# Patient Record
Sex: FEMALE | URBAN - METROPOLITAN AREA
[De-identification: names, ages, dates, MRNs, and addresses within clinical notes are randomized per-mention and may not be internally consistent; named-entity substitution may affect disease eponyms.]

---

## 2020-06-01 ENCOUNTER — TELEPHONE ENCOUNTER (OUTPATIENT)
Dept: URBAN - METROPOLITAN AREA CLINIC 23 | Facility: CLINIC | Age: 40
End: 2020-06-01

## 2020-06-02 ENCOUNTER — OFFICE VISIT (OUTPATIENT)
Dept: URBAN - METROPOLITAN AREA TELEHEALTH 2 | Facility: TELEHEALTH | Age: 40
End: 2020-06-02
Payer: COMMERCIAL

## 2020-06-02 DIAGNOSIS — R19.7 DIARRHEA, UNSPECIFIED TYPE: ICD-10-CM

## 2020-06-02 DIAGNOSIS — R11.0 NAUSEA: ICD-10-CM

## 2020-06-02 PROBLEM — 422587007: Status: ACTIVE | Noted: 2020-06-02

## 2020-06-02 PROCEDURE — 99213 OFFICE O/P EST LOW 20 MIN: CPT | Performed by: INTERNAL MEDICINE

## 2020-06-02 PROCEDURE — G8420 CALC BMI NORM PARAMETERS: HCPCS | Performed by: INTERNAL MEDICINE

## 2020-06-02 PROCEDURE — G9903 PT SCRN TBCO ID AS NON USER: HCPCS | Performed by: INTERNAL MEDICINE

## 2020-06-02 PROCEDURE — G8427 DOCREV CUR MEDS BY ELIG CLIN: HCPCS | Performed by: INTERNAL MEDICINE

## 2020-06-02 RX ORDER — BISMUTH SUBSALICYLATE 262 MG/1
CHEW 2 TABLET BY ORAL ROUTE 4 TIMES A DAY FOR 14 DAYS TABLET, CHEWABLE ORAL
Qty: 80 | Refills: 0 | Status: ACTIVE | COMMUNITY
Start: 2020-05-28 | End: 2020-06-11

## 2020-06-02 RX ORDER — ONDANSETRON 4 MG/1
1 TABLET ON THE TONGUE AND ALLOW TO DISSOLVE TABLET, ORALLY DISINTEGRATING ORAL ONCE A DAY
Qty: 30 | OUTPATIENT
Start: 2020-06-02

## 2020-06-02 RX ORDER — FAMOTIDINE 20 MG/1
TAKE 1 TABLET BY ORAL ROUTE 2 TIMES A DAY AS NEEDED FOR 30 DAYS TABLET ORAL 2
Qty: 180 | Refills: 0 | Status: ACTIVE | COMMUNITY
Start: 2020-05-28 | End: 2020-06-27

## 2020-06-02 RX ORDER — CHOLESTYRAMINE 4 G/5G
1 SCOOP POWDER, FOR SUSPENSION ORAL TWICE A DAY
Qty: 60 | OUTPATIENT
Start: 2020-06-02

## 2020-06-02 RX ORDER — METRONIDAZOLE 250 MG/1
TAKE 1 TABLET (250 MG) BY ORAL ROUTE 4 TIMES PER DAY WITH MEALS AND AT BEDTIME WITH TETRACYCLINE AND BISMUTH SUBSALICYLATE FOR 14 DAYS TABLET ORAL
Qty: 40 | Refills: 0 | Status: ACTIVE | COMMUNITY
Start: 2020-05-28 | End: 2020-06-11

## 2020-06-02 RX ORDER — DOXYCYCLINE 100 MG/1
TAKE 1 CAPSULE BY ORAL ROUTE 2 TIMES A DAY FOR 14 DAYS CAPSULE ORAL 2
Qty: 20 | Refills: 0 | Status: ACTIVE | COMMUNITY
Start: 2020-05-28 | End: 2020-06-11

## 2020-06-02 RX ORDER — COLESEVELAM HYDROCHLORIDE 625 MG/1
TAKE 1 TABLET BY ORAL ROUTE 3 TIMES A DAY FOR 90 DAYS TABLET, FILM COATED ORAL
Qty: 270 | Refills: 1 | Status: ACTIVE | COMMUNITY
Start: 2019-12-31 | End: 2020-06-28

## 2020-06-02 NOTE — HPI-TODAY'S VISIT:
She has been having increasing diarrhea over the past several days. She has already had 14 bowel movements at the time of our phone conversation today at 2 PM.   She did recently start metronidazole and tetracycline after being called by her pharmacy to come in to  the medications.  On review of her records this was likely sent in due to h-pylori during a hospitalization is April. She is having nasuea and has been taking zofran, welchol and imodium without relief.   Attempts were made to use real-time two-way video technology for today's encounter. Due to a technological failure or access issues, telephone technology was used in lieu of an office visit due to the current COVID-19 pandemic crisis and need for social isolation. Patient seen today via telehealth by agreement and consent of patient in light of current COVID-19 pandemic. I used a telephone call during the visit. The patient encounter is appropriate and reasonable under the circumstances given the patient's particular presentation at this time. The patient has been advised of the followin) the potential risks and limitations of this mode of treatment (including but not limited to the absence of in-person examination); 2) the right to refuse telehealth services at any point without affecting the right to future care; 3) the right to receive in-person services, included immediately after this consultation if an urgent need arises; 4) information, including identifiable images or information from this telehealth consult, will only be shared in accordance with HIPPA regulations. Any and all of the patient's and/or patient's family member's questions on this issue have been answered. The patient has verbally consented to be treated via telehealth services. The patient has also been advised to contact this office for worsening conditions or problems, and seek emergency medical treatment and/or call 911 if the patient deems either necessary. [Procedure: ___] : Procedure performed: [unfilled]  [Date of Surgery: ___] : Date of Surgery:   [unfilled] [___ Months Post Op] : [unfilled] months [Pre-Op Weight ___] : Pre-op weight was [unfilled] lbs [de-identified] : Patient had surgery approximately 3 months ago. \par She had an episode of heartburn but denies reflux/vomiting and food intolerance.\par Diabetes resolved and her hypertension, back and knee pain all improved.  She is using her CPAP machine but her throat is very dry in the mornings.  Recommend that she follows up with pulmonary.

## 2020-06-03 ENCOUNTER — TELEPHONE ENCOUNTER (OUTPATIENT)
Dept: URBAN - METROPOLITAN AREA CLINIC 92 | Facility: CLINIC | Age: 40
End: 2020-06-03

## 2020-06-11 ENCOUNTER — TELEPHONE ENCOUNTER (OUTPATIENT)
Dept: URBAN - METROPOLITAN AREA CLINIC 92 | Facility: CLINIC | Age: 40
End: 2020-06-11

## 2020-06-11 ENCOUNTER — TELEPHONE ENCOUNTER (OUTPATIENT)
Dept: URBAN - METROPOLITAN AREA CLINIC 80 | Facility: CLINIC | Age: 40
End: 2020-06-11

## 2020-06-12 ENCOUNTER — OUT OF OFFICE VISIT (OUTPATIENT)
Dept: URBAN - METROPOLITAN AREA MEDICAL CENTER 18 | Facility: MEDICAL CENTER | Age: 40
End: 2020-06-12
Payer: COMMERCIAL

## 2020-06-12 DIAGNOSIS — R10.84 ABDOMINAL CRAMPING, GENERALIZED: ICD-10-CM

## 2020-06-12 DIAGNOSIS — Z85.048 HISTORY OF ANAL CANCER: ICD-10-CM

## 2020-06-12 DIAGNOSIS — R19.7 ACUTE DIARRHEA: ICD-10-CM

## 2020-06-12 DIAGNOSIS — A04.72 C. DIFFICILE COLITIS: ICD-10-CM

## 2020-06-12 PROCEDURE — 99253 IP/OBS CNSLTJ NEW/EST LOW 45: CPT | Performed by: INTERNAL MEDICINE

## 2020-06-12 PROCEDURE — 99232 SBSQ HOSP IP/OBS MODERATE 35: CPT | Performed by: INTERNAL MEDICINE

## 2020-06-12 PROCEDURE — G8427 DOCREV CUR MEDS BY ELIG CLIN: HCPCS | Performed by: INTERNAL MEDICINE

## 2020-06-17 ENCOUNTER — TELEPHONE ENCOUNTER (OUTPATIENT)
Dept: URBAN - METROPOLITAN AREA CLINIC 80 | Facility: CLINIC | Age: 40
End: 2020-06-17

## 2020-06-29 ENCOUNTER — OFFICE VISIT (OUTPATIENT)
Dept: URBAN - METROPOLITAN AREA CLINIC 80 | Facility: CLINIC | Age: 40
End: 2020-06-29
Payer: COMMERCIAL

## 2020-06-29 DIAGNOSIS — A04.8 HELICOBACTER PYLORI (H. PYLORI): ICD-10-CM

## 2020-06-29 DIAGNOSIS — K21.9 GASTROESOPHAGEAL REFLUX DISEASE WITHOUT ESOPHAGITIS: ICD-10-CM

## 2020-06-29 DIAGNOSIS — Z85.038 HISTORY OF COLON CANCER: ICD-10-CM

## 2020-06-29 DIAGNOSIS — A04.72 C. DIFFICILE COLITIS: ICD-10-CM

## 2020-06-29 PROCEDURE — G8427 DOCREV CUR MEDS BY ELIG CLIN: HCPCS | Performed by: INTERNAL MEDICINE

## 2020-06-29 PROCEDURE — 1036F TOBACCO NON-USER: CPT | Performed by: INTERNAL MEDICINE

## 2020-06-29 PROCEDURE — 99214 OFFICE O/P EST MOD 30 MIN: CPT | Performed by: INTERNAL MEDICINE

## 2020-06-29 PROCEDURE — G8417 CALC BMI ABV UP PARAM F/U: HCPCS | Performed by: INTERNAL MEDICINE

## 2020-06-29 RX ORDER — COLESEVELAM HYDROCHLORIDE 625 MG/1
1 TABLET TABLET, FILM COATED ORAL TWICE A DAY
Qty: 60 | Refills: 3 | OUTPATIENT
Start: 2020-06-29

## 2020-06-29 RX ORDER — ONDANSETRON 4 MG/1
1 TABLET ON THE TONGUE AND ALLOW TO DISSOLVE TABLET, ORALLY DISINTEGRATING ORAL ONCE A DAY
Qty: 30 | Status: ACTIVE | COMMUNITY
Start: 2020-06-02

## 2020-06-29 RX ORDER — VANCOMYCIN HYDROCHLORIDE 250 MG/1
1 CAPSULE CAPSULE ORAL
Qty: 56 CAPSULE | Refills: 1 | OUTPATIENT
Start: 2020-06-29 | End: 2020-07-27

## 2020-06-29 RX ORDER — PANTOPRAZOLE SODIUM 40 MG/1
1 TABLET TABLET, DELAYED RELEASE ORAL ONCE A DAY
Qty: 30 TABLET | Refills: 3 | OUTPATIENT
Start: 2020-06-29

## 2020-06-29 RX ORDER — CHOLESTYRAMINE 4 G/5G
1 SCOOP POWDER, FOR SUSPENSION ORAL TWICE A DAY
Qty: 60 | Status: ACTIVE | COMMUNITY
Start: 2020-06-02

## 2020-06-29 NOTE — HPI-TODAY'S VISIT:
Hospitalized with cdiff 2nd wk of June: Vanco, then dificd added; improved; on discharge was given Dificid but was too expensive: Vanco 250 QID, that finished on June 21- has been off for a week and did well until yesterday.  Diarrhea resumed +nausea.  Finished Welchol.  Protonix also feels like that is a major factor.   Hospitalized for Cdiff beginning of June.  Review is that Abx for Hyplori caused initial Cdiff

## 2020-06-29 NOTE — PHYSICAL EXAM HENT:
Head, normocephalic, atraumatic, Face, Face within normal limits, Ears, External ears within normal limits, Nose/Nasopharynx, External nose  normal appearance, nares patent, no nasal discharge, Mouth and Throat, Oral cavity appearance normal, Breath odor normal, Lips, Appearance normal
33

## 2020-07-13 ENCOUNTER — OFFICE VISIT (OUTPATIENT)
Dept: URBAN - METROPOLITAN AREA CLINIC 80 | Facility: CLINIC | Age: 40
End: 2020-07-13

## 2020-08-05 ENCOUNTER — OFFICE VISIT (OUTPATIENT)
Dept: URBAN - METROPOLITAN AREA CLINIC 80 | Facility: CLINIC | Age: 40
End: 2020-08-05
Payer: COMMERCIAL

## 2020-08-05 ENCOUNTER — LAB OUTSIDE AN ENCOUNTER (OUTPATIENT)
Dept: URBAN - METROPOLITAN AREA CLINIC 80 | Facility: CLINIC | Age: 40
End: 2020-08-05

## 2020-08-05 DIAGNOSIS — R10.84 GENERALIZED ABDOMINAL PAIN: ICD-10-CM

## 2020-08-05 DIAGNOSIS — A04.8 H. PYLORI INFECTION: ICD-10-CM

## 2020-08-05 DIAGNOSIS — R12 HEARTBURN: ICD-10-CM

## 2020-08-05 DIAGNOSIS — A04.72 C. DIFFICILE DIARRHEA: ICD-10-CM

## 2020-08-05 DIAGNOSIS — K21.9 GASTROESOPHAGEAL REFLUX DISEASE WITHOUT ESOPHAGITIS: ICD-10-CM

## 2020-08-05 PROBLEM — 5891000119102: Status: ACTIVE | Noted: 2020-08-05

## 2020-08-05 PROBLEM — 721730009: Status: ACTIVE | Noted: 2020-08-05

## 2020-08-05 PROCEDURE — G8427 DOCREV CUR MEDS BY ELIG CLIN: HCPCS | Performed by: PHYSICIAN ASSISTANT

## 2020-08-05 PROCEDURE — 99213 OFFICE O/P EST LOW 20 MIN: CPT | Performed by: PHYSICIAN ASSISTANT

## 2020-08-05 PROCEDURE — G9903 PT SCRN TBCO ID AS NON USER: HCPCS | Performed by: PHYSICIAN ASSISTANT

## 2020-08-05 PROCEDURE — G9716 BMI DOC ONL FUP NOT CMPLTD: HCPCS | Performed by: PHYSICIAN ASSISTANT

## 2020-08-05 RX ORDER — COLESEVELAM HYDROCHLORIDE 625 MG/1
1 TABLET TABLET, FILM COATED ORAL TWICE A DAY
Qty: 60 | Refills: 3 | Status: DISCONTINUED | COMMUNITY
Start: 2020-06-29

## 2020-08-05 RX ORDER — CHOLESTYRAMINE 4 G/5G
1 SCOOP POWDER, FOR SUSPENSION ORAL TWICE A DAY
Qty: 60 | Status: DISCONTINUED | COMMUNITY
Start: 2020-06-02

## 2020-08-05 RX ORDER — ONDANSETRON 4 MG/1
1 TABLET ON THE TONGUE AND ALLOW TO DISSOLVE TABLET, ORALLY DISINTEGRATING ORAL ONCE A DAY
Qty: 30 | Status: ACTIVE | COMMUNITY
Start: 2020-06-02

## 2020-08-05 RX ORDER — PANTOPRAZOLE SODIUM 40 MG/1
1 TABLET TABLET, DELAYED RELEASE ORAL ONCE A DAY
Qty: 30 TABLET | Refills: 3 | Status: ACTIVE | COMMUNITY
Start: 2020-06-29

## 2020-08-05 RX ORDER — PANTOPRAZOLE SODIUM 40 MG/1
1 TABLET TABLET, DELAYED RELEASE ORAL ONCE A DAY
Qty: 90 TABLET | Refills: 3

## 2020-08-05 NOTE — PHYSICAL EXAM GASTROINTESTINAL
Abdomen, soft, tender throughout the abdomen, nondistended, no guarding or rigidity, no masses palpable, normal bowel sounds, Liver and Spleen, no hepatomegaly present, no hepatosplenomegaly, liver nontenderRectal,deferred

## 2020-08-05 NOTE — HPI-OTHER HISTORIES
Patient presents stating that that heartburn over the past few weeks has gotten a lot worse - increased regurgitation She also had abd pain that started a week ago - saw PCP and was also in the hospital at [facility] because the pain got so bad - was told she had a UTI - pain in lower abd and low back - put her on Keflex - helping some Also has had a headache for 3 weeks - scanned her head and it was normal Did not check her for Covid No fevers or chills, no resp symptoms other than sinuses and she gets a ct of her sinuses this week as well as allergy testing Nausea and emesis - no hematemesis or coffee ground emesis She was on Protonix - has not been refilled - when on it she was doing a lot better - has been out of it for about a month now No trouble swallowing BM are daily - no BRBPR or melena Not taking Questran or Welchol anymore Last EGD 4/12/2020 - antritis and h. pylori 4/3/2020 - large non bleeding internal hemorrhoids Had c. diff in June from the h. pylori treatment meds - has not had any test to see if h. pylori is erradicated or not On Florastor daily THe pain she is having is different than any pain she has had before - she gets a sharp pain before and during urination and BM

## 2020-08-11 ENCOUNTER — TELEPHONE ENCOUNTER (OUTPATIENT)
Dept: URBAN - METROPOLITAN AREA CLINIC 80 | Facility: CLINIC | Age: 40
End: 2020-08-11

## 2020-08-13 ENCOUNTER — OFFICE VISIT (OUTPATIENT)
Dept: URBAN - METROPOLITAN AREA CLINIC 80 | Facility: CLINIC | Age: 40
End: 2020-08-13

## 2020-10-16 ENCOUNTER — ERX REFILL RESPONSE (OUTPATIENT)
Dept: URBAN - METROPOLITAN AREA CLINIC 13 | Facility: CLINIC | Age: 40
End: 2020-10-16

## 2020-10-16 RX ORDER — DICYCLOMINE HYDROCHLORIDE 10 MG/1
TAKE ONE CAPSULE BY MOUTH THREE TIMES A DAY CAPSULE ORAL
Qty: 90 | Refills: 2

## 2021-02-26 ENCOUNTER — OFFICE VISIT (OUTPATIENT)
Dept: URBAN - METROPOLITAN AREA CLINIC 78 | Facility: CLINIC | Age: 41
End: 2021-02-26
Payer: COMMERCIAL

## 2021-02-26 ENCOUNTER — DASHBOARD ENCOUNTERS (OUTPATIENT)
Age: 41
End: 2021-02-26

## 2021-02-26 VITALS
RESPIRATION RATE: 16 BRPM | SYSTOLIC BLOOD PRESSURE: 109 MMHG | BODY MASS INDEX: 31.75 KG/M2 | HEIGHT: 65 IN | DIASTOLIC BLOOD PRESSURE: 72 MMHG | HEART RATE: 80 BPM | WEIGHT: 190.6 LBS | TEMPERATURE: 97.3 F

## 2021-02-26 DIAGNOSIS — R10.84 GENERALIZED ABDOMINAL PAIN: ICD-10-CM

## 2021-02-26 DIAGNOSIS — Z09 HOSPITAL DISCHARGE FOLLOW-UP: ICD-10-CM

## 2021-02-26 DIAGNOSIS — R07.81 RIB TENDERNESS: ICD-10-CM

## 2021-02-26 DIAGNOSIS — G89.29 CHRONIC IDIOPATHIC PAIN SYNDROME: ICD-10-CM

## 2021-02-26 DIAGNOSIS — K59.01 CONSTIPATION BY DELAYED COLONIC TRANSIT: ICD-10-CM

## 2021-02-26 DIAGNOSIS — Z85.048 PERSONAL HISTORY OF RECTAL CANCER: ICD-10-CM

## 2021-02-26 DIAGNOSIS — R12 HEARTBURN: ICD-10-CM

## 2021-02-26 PROBLEM — 161542005: Status: ACTIVE | Noted: 2021-02-26

## 2021-02-26 PROBLEM — 102614006: Status: ACTIVE | Noted: 2020-08-05

## 2021-02-26 PROBLEM — 35298007: Status: ACTIVE | Noted: 2021-02-26

## 2021-02-26 PROBLEM — 16331000: Status: ACTIVE | Noted: 2020-08-05

## 2021-02-26 PROCEDURE — 99214 OFFICE O/P EST MOD 30 MIN: CPT | Performed by: INTERNAL MEDICINE

## 2021-02-26 RX ORDER — PANTOPRAZOLE SODIUM 40 MG/1
1 TABLET TABLET, DELAYED RELEASE ORAL ONCE A DAY
Qty: 90 TABLET | Refills: 3 | Status: ACTIVE | COMMUNITY

## 2021-02-26 RX ORDER — ONDANSETRON 4 MG/1
1 TABLET ON THE TONGUE AND ALLOW TO DISSOLVE TABLET, ORALLY DISINTEGRATING ORAL ONCE A DAY
Qty: 30 | Status: ACTIVE | COMMUNITY
Start: 2020-06-02

## 2021-02-26 RX ORDER — PANTOPRAZOLE SODIUM 40 MG/1
1 TABLET TABLET, DELAYED RELEASE ORAL ONCE A DAY
OUTPATIENT

## 2021-02-26 RX ORDER — DICYCLOMINE HYDROCHLORIDE 10 MG/1
TAKE ONE CAPSULE BY MOUTH THREE TIMES A DAY CAPSULE ORAL
Qty: 90 | Refills: 2 | Status: ACTIVE | COMMUNITY

## 2021-02-26 NOTE — PHYSICAL EXAM CHEST:
breathing is un-labored without accessory muscle use, normal breath sounds - rib tenderness - costochondral tenderness

## 2021-02-26 NOTE — HPI-TODAY'S VISIT:
Patient known to our GI practice . Chart review reveals a personal history of rectal cancer status post surgery, history of C. difficile, history of chronic abdominal pain and frequent hospitalization.  History of colonoscopies in 2019 and twice in 2020.  History of adrenal insufficiency.  She has been to the emergency room twice this year and admits to having multiple hospitalizations last year mostly at [facility].  Her recent ER visit was at a hospital at [place] where she presented with abdominal pain and indigestion.  Discharge paperwork did not include labs for her CT scan.  As per the patient she had an unrevealing CT scan and reportedly her labs were normal. EGD dated 4/12/2020 is unremarkable for ulcers or esophagitis or H. pylori Colonoscopy dated 4/3/2020 by Dr. Paredes is unremarkable except for the prep is noted to be fair and commented on hemorrhoids and diverticulosis Colonoscopy on 12/17/2019 by Dr. Myra Webb revealed a normal colonic mucosa and healthy appearing rectal anastomosis with recommendations to repeat in 3 years.  A colonoscopy attempted on 12/16/2019 was aborted because of poor prep.  There is a history of constipation noted on the flex sig on 10/18/2019. Chart review revealed in 2018 , patient is a recovering meth and Etoh user .   Patient is seen d/c from [place]  She went to ER there for abdominal pain  Reportedly dx CT scan was unremarkable  Labs were ok   She has been referred to Rheumatology in past    Abdominal pain is epigastric and ribs bilaterally    Hx of C.diff x 2     Patient has been to ER 2 x this year  Last year many times for pain  Bentyl does not help
